# Patient Record
Sex: FEMALE | Race: WHITE | NOT HISPANIC OR LATINO | Employment: OTHER | URBAN - METROPOLITAN AREA
[De-identification: names, ages, dates, MRNs, and addresses within clinical notes are randomized per-mention and may not be internally consistent; named-entity substitution may affect disease eponyms.]

---

## 2022-11-25 ENCOUNTER — HOSPITAL ENCOUNTER (EMERGENCY)
Facility: HOSPITAL | Age: 62
Discharge: HOME | End: 2022-11-25
Attending: EMERGENCY MEDICINE

## 2022-11-25 ENCOUNTER — APPOINTMENT (EMERGENCY)
Dept: RADIOLOGY | Facility: HOSPITAL | Age: 62
End: 2022-11-25

## 2022-11-25 VITALS
HEART RATE: 80 BPM | RESPIRATION RATE: 18 BRPM | OXYGEN SATURATION: 97 % | HEIGHT: 65 IN | DIASTOLIC BLOOD PRESSURE: 89 MMHG | TEMPERATURE: 97.5 F | BODY MASS INDEX: 33.32 KG/M2 | SYSTOLIC BLOOD PRESSURE: 156 MMHG | WEIGHT: 200 LBS

## 2022-11-25 DIAGNOSIS — M25.562 LEFT KNEE PAIN: Primary | ICD-10-CM

## 2022-11-25 RX ORDER — MELOXICAM 7.5 MG/1
7.5 TABLET ORAL DAILY
Qty: 14 TABLET | Refills: 0 | Status: SHIPPED | OUTPATIENT
Start: 2022-11-25

## 2022-11-25 RX ORDER — OXYCODONE HYDROCHLORIDE AND ACETAMINOPHEN 5; 325 MG/1; MG/1
1 TABLET ORAL ONCE
Status: COMPLETED | OUTPATIENT
Start: 2022-11-25 | End: 2022-11-25

## 2022-11-25 RX ORDER — OXYCODONE HYDROCHLORIDE AND ACETAMINOPHEN 5; 325 MG/1; MG/1
1 TABLET ORAL EVERY 4 HOURS PRN
Qty: 6 TABLET | Refills: 0 | Status: SHIPPED | OUTPATIENT
Start: 2022-11-25 | End: 2022-12-05

## 2022-11-25 RX ADMIN — OXYCODONE HYDROCHLORIDE AND ACETAMINOPHEN 1 TABLET: 5; 325 TABLET ORAL at 11:37

## 2022-11-25 NOTE — ED PROVIDER NOTES
History  Chief Complaint   Patient presents with   • Knee Pain     C/o left knee pain since yesterday  56 y/o female presents to the ED for L knee pain since yesterday  She states that she has chronic L hip pain for which she has had extensive workup in the past for  States that she was standing on her legs all day yesterday cooking and last night when she was cleaning the pain in her L knee began  She denies any known injury or fall  Has tried aleve without relief  Reports that it hurts worse with bearing weight and ambulation  She c/o swelling but denies redness, warmth, or fever  Denies any hip or back pain  Has chronic numbness in her LLE which is unchanged  History provided by:  Patient  Knee Pain  Location:  Knee  Knee location:  L knee  Pain details:     Quality:  Throbbing    Radiates to:  Does not radiate    Severity:  Moderate    Onset quality:  Sudden    Timing:  Constant    Progression:  Worsening  Chronicity:  New  Prior injury to area:  No  Relieved by:  None tried  Worsened by:  Nothing  Ineffective treatments:  None tried  Associated symptoms: swelling    Associated symptoms: no fever, no neck pain and no tingling        None       History reviewed  No pertinent past medical history  History reviewed  No pertinent surgical history  History reviewed  No pertinent family history  I have reviewed and agree with the history as documented  E-Cigarette/Vaping     E-Cigarette/Vaping Substances     Social History     Tobacco Use   • Smoking status: Never   • Smokeless tobacco: Never   Substance Use Topics   • Alcohol use: Yes   • Drug use: Yes     Types: Marijuana       Review of Systems   Constitutional: Negative for chills and fever  HENT: Negative for congestion, ear pain and sore throat  Eyes: Negative for pain and visual disturbance  Respiratory: Negative for cough, shortness of breath and wheezing  Cardiovascular: Negative for chest pain and leg swelling  Gastrointestinal: Negative for abdominal pain, diarrhea, nausea and vomiting  Genitourinary: Negative for dysuria, frequency, hematuria and urgency  Musculoskeletal: Negative for neck pain and neck stiffness  Skin: Negative for rash and wound  Neurological: Negative for weakness, numbness and headaches  Psychiatric/Behavioral: Negative for agitation and confusion  All other systems reviewed and are negative  Physical Exam  Physical Exam  Vitals and nursing note reviewed  Constitutional:       Appearance: She is well-developed and well-nourished  HENT:      Head: Normocephalic and atraumatic  Eyes:      Extraocular Movements: EOM normal       Pupils: Pupils are equal, round, and reactive to light  Cardiovascular:      Rate and Rhythm: Normal rate and regular rhythm  Pulmonary:      Effort: Pulmonary effort is normal       Breath sounds: Normal breath sounds  Abdominal:      General: Bowel sounds are normal       Palpations: Abdomen is soft  Musculoskeletal:      Cervical back: Normal range of motion and neck supple  Comments: LLE- swelling, tenderness to the medial knee  No redness or warmth  NV intact distally  No ligamentous laxity  Skin:     General: Skin is warm and dry  Neurological:      General: No focal deficit present  Mental Status: She is alert and oriented to person, place, and time        Comments: No focal deficits         Vital Signs  ED Triage Vitals   Temperature Pulse Respirations Blood Pressure SpO2   11/25/22 1024 11/25/22 1024 11/25/22 1024 11/25/22 1024 11/25/22 1024   97 5 °F (36 4 °C) 80 18 156/89 97 %      Temp Source Heart Rate Source Patient Position - Orthostatic VS BP Location FiO2 (%)   11/25/22 1024 11/25/22 1024 11/25/22 1024 11/25/22 1024 --   Tympanic Monitor Sitting Left arm       Pain Score       11/25/22 1137       10 - Worst Possible Pain           Vitals:    11/25/22 1024   BP: 156/89   Pulse: 80   Patient Position - Orthostatic VS: Sitting         Visual Acuity      ED Medications  Medications   oxyCODONE-acetaminophen (PERCOCET) 5-325 mg per tablet 1 tablet (1 tablet Oral Given 11/25/22 1137)       Diagnostic Studies  Results Reviewed     None                 XR knee 4+ vw left injury   Final Result by Shital Benites MD (11/25 1302)      No acute osseous abnormality  Suspected mild patellofemoral compartment osteoarthrosis  Small joint effusion  Workstation performed: [id number]                    Procedures  Procedures         ED Course                                             MDM  Number of Diagnoses or Management Options  Left knee pain: new and requires workup  Diagnosis management comments: Patient with L knee pain- will get xray and give percocet for pain  Will provide crutches, knee brace, and ortho follow up     Patient reevaluated and feels improved  Patient updated on results of tests  Discharge instructions given including medications, follow-up, and return precautions  Patient demonstrates verbal understanding and agrees with plan         Amount and/or Complexity of Data Reviewed  Clinical lab tests: ordered and reviewed  Tests in the radiology section of CPT®: ordered and reviewed  Tests in the medicine section of CPT®: ordered and reviewed  Discussion of test results with the performing providers: yes  Decide to obtain previous medical records or to obtain history from someone other than the patient: yes  Obtain history from someone other than the patient: yes  Review and summarize past medical records: yes  Discuss the patient with other providers: yes  Independent visualization of images, tracings, or specimens: yes    Patient Progress  Patient progress: improved      Disposition  Final diagnoses:   Left knee pain     Time reflects when diagnosis was documented in both MDM as applicable and the Disposition within this note     Time User Action Codes Description Comment    11/25/2022 12:18 PM Marta MUELLER Add [I63 [id number]] Left knee pain       ED Disposition     ED Disposition   Discharge    Condition   Stable    Date/Time   Fri Nov 25, 2022 12:18 PM    Comment   Marquita Sharpe V discharge to home/self care  Follow-up Information     Follow up With Specialties Details Why Contact Info Additional [address] [facility] Call in 1 day for follow up within 1 week [address] [facility]  Sandro Malagon [address] [facility] [place], [address] [address] [place], [place], [facility]    [facility] Emergency Medicine Go to  immediately for any new or worsening symptoms [address] [facility], [address] [place], [place], [address]          Discharge Medication List as of 11/25/2022 12:20 PM      START taking these medications    Details   meloxicam (Mobic) 7 5 mg tablet Take 1 tablet (7 5 mg total) by mouth daily, Starting Fri 11/25/2022, Print      oxyCODONE-acetaminophen (Percocet) 5-325 mg per tablet Take 1 tablet by mouth every 4 (four) hours as needed for moderate pain for up to 10 days Max Daily Amount: 6 tablets, Starting Fri 11/25/2022, Until Mon 12/5/2022 at 2359, Print             No discharge procedures on file      PDMP Review     None          ED Provider  Electronically Signed by           Santiago Beverly DO  11/25/22 8337